# Patient Record
Sex: FEMALE | Race: WHITE
[De-identification: names, ages, dates, MRNs, and addresses within clinical notes are randomized per-mention and may not be internally consistent; named-entity substitution may affect disease eponyms.]

---

## 2018-08-05 ENCOUNTER — HOSPITAL ENCOUNTER (EMERGENCY)
Dept: HOSPITAL 59 - ER | Age: 38
Discharge: HOME | End: 2018-08-05
Payer: SELF-PAY

## 2018-08-05 DIAGNOSIS — Z04.71: Primary | ICD-10-CM

## 2018-08-05 DIAGNOSIS — F17.210: ICD-10-CM

## 2018-08-05 PROCEDURE — 99282 EMERGENCY DEPT VISIT SF MDM: CPT
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
